# Patient Record
Sex: MALE | Race: BLACK OR AFRICAN AMERICAN | NOT HISPANIC OR LATINO | Employment: PART TIME | URBAN - METROPOLITAN AREA
[De-identification: names, ages, dates, MRNs, and addresses within clinical notes are randomized per-mention and may not be internally consistent; named-entity substitution may affect disease eponyms.]

---

## 2023-10-23 ENCOUNTER — HOSPITAL ENCOUNTER (EMERGENCY)
Facility: HOSPITAL | Age: 18
Discharge: HOME | End: 2023-10-23
Payer: COMMERCIAL

## 2023-10-23 VITALS
RESPIRATION RATE: 20 BRPM | WEIGHT: 215 LBS | DIASTOLIC BLOOD PRESSURE: 84 MMHG | HEART RATE: 87 BPM | OXYGEN SATURATION: 98 % | TEMPERATURE: 98.2 F | BODY MASS INDEX: 26.73 KG/M2 | SYSTOLIC BLOOD PRESSURE: 141 MMHG | HEIGHT: 75 IN

## 2023-10-23 PROCEDURE — 4500999001 HC ED NO CHARGE

## 2023-10-23 PROCEDURE — 99281 EMR DPT VST MAYX REQ PHY/QHP: CPT

## 2023-10-23 ASSESSMENT — PAIN DESCRIPTION - ORIENTATION: ORIENTATION: LEFT

## 2023-10-23 ASSESSMENT — PAIN - FUNCTIONAL ASSESSMENT: PAIN_FUNCTIONAL_ASSESSMENT: 0-10

## 2023-10-23 ASSESSMENT — PAIN DESCRIPTION - DESCRIPTORS: DESCRIPTORS: SHARP;SORE

## 2023-10-23 ASSESSMENT — PAIN DESCRIPTION - PAIN TYPE: TYPE: ACUTE PAIN

## 2023-10-23 ASSESSMENT — PAIN SCALES - GENERAL: PAINLEVEL_OUTOF10: 9

## 2023-10-23 ASSESSMENT — PAIN DESCRIPTION - LOCATION: LOCATION: HAND

## 2023-10-23 ASSESSMENT — PAIN DESCRIPTION - FREQUENCY: FREQUENCY: CONSTANT/CONTINUOUS

## 2024-03-01 ENCOUNTER — HOSPITAL ENCOUNTER (EMERGENCY)
Facility: HOSPITAL | Age: 19
Discharge: HOME | End: 2024-03-01
Payer: COMMERCIAL

## 2024-03-01 PROCEDURE — 4500999001 HC ED NO CHARGE

## 2024-05-28 ENCOUNTER — HOSPITAL ENCOUNTER (EMERGENCY)
Facility: HOSPITAL | Age: 19
Discharge: HOME | End: 2024-05-28
Payer: COMMERCIAL

## 2024-05-28 ENCOUNTER — APPOINTMENT (OUTPATIENT)
Dept: RADIOLOGY | Facility: HOSPITAL | Age: 19
End: 2024-05-28
Payer: COMMERCIAL

## 2024-05-28 VITALS
DIASTOLIC BLOOD PRESSURE: 71 MMHG | HEART RATE: 54 BPM | BODY MASS INDEX: 24.87 KG/M2 | OXYGEN SATURATION: 97 % | TEMPERATURE: 98.6 F | SYSTOLIC BLOOD PRESSURE: 142 MMHG | HEIGHT: 75 IN | RESPIRATION RATE: 18 BRPM | WEIGHT: 200 LBS

## 2024-05-28 DIAGNOSIS — M25.512 ACUTE PAIN OF LEFT SHOULDER: Primary | ICD-10-CM

## 2024-05-28 PROCEDURE — 2500000001 HC RX 250 WO HCPCS SELF ADMINISTERED DRUGS (ALT 637 FOR MEDICARE OP)

## 2024-05-28 PROCEDURE — 73030 X-RAY EXAM OF SHOULDER: CPT | Mod: LEFT SIDE | Performed by: RADIOLOGY

## 2024-05-28 PROCEDURE — 73030 X-RAY EXAM OF SHOULDER: CPT | Mod: LT

## 2024-05-28 PROCEDURE — 99283 EMERGENCY DEPT VISIT LOW MDM: CPT

## 2024-05-28 RX ORDER — ACETAMINOPHEN 325 MG/1
975 TABLET ORAL ONCE
Status: COMPLETED | OUTPATIENT
Start: 2024-05-28 | End: 2024-05-28

## 2024-05-28 RX ORDER — IBUPROFEN 400 MG/1
400 TABLET ORAL ONCE
Status: COMPLETED | OUTPATIENT
Start: 2024-05-28 | End: 2024-05-28

## 2024-05-28 RX ADMIN — IBUPROFEN 400 MG: 400 TABLET, FILM COATED ORAL at 14:48

## 2024-05-28 RX ADMIN — ACETAMINOPHEN 975 MG: 325 TABLET ORAL at 14:48

## 2024-05-28 ASSESSMENT — COLUMBIA-SUICIDE SEVERITY RATING SCALE - C-SSRS
1. IN THE PAST MONTH, HAVE YOU WISHED YOU WERE DEAD OR WISHED YOU COULD GO TO SLEEP AND NOT WAKE UP?: NO
6. HAVE YOU EVER DONE ANYTHING, STARTED TO DO ANYTHING, OR PREPARED TO DO ANYTHING TO END YOUR LIFE?: NO
2. HAVE YOU ACTUALLY HAD ANY THOUGHTS OF KILLING YOURSELF?: NO

## 2024-05-28 ASSESSMENT — PAIN SCALES - GENERAL
PAINLEVEL_OUTOF10: 6
PAINLEVEL_OUTOF10: 4

## 2024-05-28 ASSESSMENT — PAIN - FUNCTIONAL ASSESSMENT: PAIN_FUNCTIONAL_ASSESSMENT: 0-10

## 2024-05-28 ASSESSMENT — LIFESTYLE VARIABLES
HAVE YOU EVER FELT YOU SHOULD CUT DOWN ON YOUR DRINKING: NO
HAVE PEOPLE ANNOYED YOU BY CRITICIZING YOUR DRINKING: NO
TOTAL SCORE: 0
EVER HAD A DRINK FIRST THING IN THE MORNING TO STEADY YOUR NERVES TO GET RID OF A HANGOVER: NO
EVER FELT BAD OR GUILTY ABOUT YOUR DRINKING: NO

## 2024-05-28 ASSESSMENT — PAIN DESCRIPTION - PAIN TYPE: TYPE: ACUTE PAIN

## 2024-05-28 ASSESSMENT — PAIN DESCRIPTION - LOCATION: LOCATION: SHOULDER

## 2024-05-28 ASSESSMENT — PAIN DESCRIPTION - ORIENTATION: ORIENTATION: LEFT

## 2024-05-28 NOTE — ED PROVIDER NOTES
HPI   Chief Complaint   Patient presents with    Shoulder Pain     C.o left shoulder pain.        Patient is a 19-year-old male presents emergency department for evaluation of left shoulder pain.  Patient states that over the last few days he has had pain with external rotation and abduction overhead of his left shoulder.  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  He does not recall any specific injury or trauma.  He denies any previous injuries or issues with the left shoulder.  He states that this morning when he woke up the pain became more severe and he presents for further evaluation.  He describes it as a dull aching pain with occasional episodes of sharp pain if he tries to externally rotate his left shoulder.  He denies any pain in the elbow wrist or hand.  He denies any other complaints at this time.      History provided by:  Patient   used: No                        [place] Coma Scale Score: 15                     Patient History   Past Medical History:   Diagnosis Date    Other conditions influencing health status     No significant past surgical history     No past surgical history on file.  No family history on file.  Social History     Tobacco Use    Smoking status: Not on file    Smokeless tobacco: Not on file   Substance Use Topics    Alcohol use: Not on file    Drug use: Not on file       Physical Exam   ED Triage Vitals [05/28/24 1418]   Temperature Heart Rate Respirations BP   37 °C (98.6 °F) 54 18 142/71      Pulse Ox Temp Source Heart Rate Source Patient Position   97 % Temporal Monitor Sitting      BP Location FiO2 (%)     Right arm --       Physical Exam  Constitutional:       Appearance: Normal appearance.   Cardiovascular:      Rate and Rhythm: Normal rate and regular rhythm.   Pulmonary:      Effort: Pulmonary effort is normal.      Breath sounds: Normal breath sounds.   Musculoskeletal:         General: Tenderness present.      Comments:  Pain with range of motion with external rotation of left shoulder in abduction overhead.  No tenderness to palpation at rest.  Left upper extremity with good distal pulses and good range of motion at elbow wrist and digits.   Skin:     General: Skin is warm and dry.   Neurological:      Mental Status: He is alert.         ED Course & MDM   Diagnoses as of 05/28/24 2027   Acute pain of left shoulder       Medical Decision Making  Patient is a 19-year-old male presents emergency department for evaluation of left shoulder pain.    Lab work not warranted at today's visit.      Scans done today were interpreted/confirmed by radiologist and also interpreted by me which included x-ray left shoulder.  X-ray shows no acute bony abnormality.    Medications given at today's visit include PO Tylenol and ibuprofen    I saw this patient independently.  Patient remained stable in the emergency department.  X-ray showed no evidence for acute bony abnormality.  Given patient's physical exam, findings most consistent with rotator cuff injury.  Given pain and inflammation patient was educated continue Tylenol and ibuprofen and to follow-up closely with orthopedic provider.  He was educated to not do any heavy lifting or strenuous activity with left upper extremity.  He was placed in sling for comfort by nursing staff.  He was neurovascular intact following sling application.  Is agreeable to plan and discharge moving forward.  Emergent pathologies were considered for this patient, although I have low suspicion for anything acutely emergent given patient's clinical presentation, history, physical exam, stable vital signs, and relatively unremarkable workup.  Discharging patient home is reasonable plan of care for outpatient management.    All labs, imaging, and diagnostic studies were reviewed by me and patient was counseled on clinical impression, expectations, and plan.  Patient was educated to follow-up with PCP in the following 1-2  days.  All questions from patient were answered. They elicited understanding and were agreeable to course of treatment.  Patient was discharged in stable condition and given strict return precautions.    ** Disclaimer:  Parts of this document were written utilizing a voice to text dictation software.  Note may contain minor transcription or typographical errors that were inadvertently transcribed by the computer software.        Procedure  Procedures     Patt Bear PA-C  05/28/24 2029

## 2024-05-28 NOTE — Clinical Note
Renita Hernandez was seen and treated in our emergency department on 5/28/2024.  He may return to work on 05/29/2024.  Patient is to have no use of left arm with no heavy lifting and to continue wearing sling for comfort until cleared by orthopedic provider or primary care provider.     If you have any questions or concerns, please don't hesitate to call.      Patt Bear PA-C

## 2024-05-28 NOTE — DISCHARGE INSTRUCTIONS
Mireya follow-up closely with primary care provider in the following week.  New provider given should you need it.  Continue wearing sling for comfort, but continue gentle range of motion stretching.  Continue Tylenol and ibuprofen.  Follow-up closely with orthopedic provider listed.

## 2024-09-16 ENCOUNTER — APPOINTMENT (OUTPATIENT)
Dept: URGENT CARE | Age: 19
End: 2024-09-16
Payer: COMMERCIAL

## 2024-09-17 ENCOUNTER — OFFICE VISIT (OUTPATIENT)
Dept: URGENT CARE | Age: 19
End: 2024-09-17
Payer: COMMERCIAL

## 2024-09-17 VITALS
HEIGHT: 74 IN | OXYGEN SATURATION: 99 % | WEIGHT: 220 LBS | BODY MASS INDEX: 28.23 KG/M2 | SYSTOLIC BLOOD PRESSURE: 137 MMHG | HEART RATE: 60 BPM | DIASTOLIC BLOOD PRESSURE: 74 MMHG | RESPIRATION RATE: 16 BRPM | TEMPERATURE: 98.1 F

## 2024-09-17 DIAGNOSIS — R30.0 DYSURIA: Primary | ICD-10-CM

## 2024-09-17 LAB
POC BILIRUBIN, URINE: NEGATIVE
POC BLOOD, URINE: NEGATIVE
POC GLUCOSE, URINE: NEGATIVE MG/DL
POC KETONES, URINE: NEGATIVE MG/DL
POC LEUKOCYTES, URINE: NEGATIVE
POC NITRITE,URINE: NEGATIVE
POC PH, URINE: 6 PH
POC PROTEIN, URINE: NEGATIVE MG/DL
POC SPECIFIC GRAVITY, URINE: 1.02
POC UROBILINOGEN, URINE: 0.2 EU/DL

## 2024-09-17 PROCEDURE — 87491 CHLMYD TRACH DNA AMP PROBE: CPT

## 2024-09-17 PROCEDURE — 87591 N.GONORRHOEAE DNA AMP PROB: CPT

## 2024-09-17 PROCEDURE — 87661 TRICHOMONAS VAGINALIS AMPLIF: CPT

## 2024-09-17 RX ORDER — DOXYCYCLINE 100 MG/1
100 CAPSULE ORAL 2 TIMES DAILY
Qty: 14 CAPSULE | Refills: 0 | Status: SHIPPED | OUTPATIENT
Start: 2024-09-17 | End: 2024-09-24

## 2024-09-17 ASSESSMENT — ENCOUNTER SYMPTOMS
FREQUENCY: 0
HEMATURIA: 0
DYSURIA: 1
DIFFICULTY URINATING: 0
FLANK PAIN: 0

## 2024-09-17 NOTE — PROGRESS NOTES
"Subjective   Patient ID: Renita Hernandez is a 19 y.o. male. They present today with a chief complaint of Exposure to STD.    History of Present Illness  Pt presents for STI testing. Reports dysuria x 2 weeks. Girlfriend recently tested positive for chlamydia. He reports 1 female partner, no protection. Denies fever chills abd pain back pain nv testicular pain or swelling penile discharge/sores or itching hematuria urinary frequency or urgency.        Exposure to STD      Past Medical History  Allergies as of 09/17/2024    (No Known Allergies)       (Not in a hospital admission)       Past Medical History:   Diagnosis Date    Other conditions influencing health status     No significant past surgical history       No past surgical history on file.     reports that he has never smoked. He has never used smokeless tobacco.    Review of Systems  Review of Systems   Genitourinary:  Positive for dysuria. Negative for difficulty urinating, flank pain, frequency, genital sores, hematuria, penile discharge, penile pain, penile swelling, scrotal swelling, testicular pain and urgency.   All other systems reviewed and are negative.                                 Objective    Vitals:    09/17/24 1119   BP: 137/74   Pulse: 60   Resp: 16   Temp: 36.7 °C (98.1 °F)   SpO2: 99%   Weight: 99.8 kg (220 lb)   Height: 1.88 m (6' 2\")     No LMP for male patient.    Physical Exam  Vitals and nursing note reviewed.   Constitutional:       General: He is not in acute distress.     Appearance: Normal appearance. He is not ill-appearing, toxic-appearing or diaphoretic.   Cardiovascular:      Rate and Rhythm: Normal rate.      Pulses: Normal pulses.   Pulmonary:      Effort: Pulmonary effort is normal.      Breath sounds: Normal breath sounds. No wheezing, rhonchi or rales.   Abdominal:      General: Bowel sounds are normal.      Palpations: Abdomen is soft.      Tenderness: There is no abdominal tenderness. There is no right CVA tenderness, " left CVA tenderness, guarding or rebound.   Genitourinary:     Comments: Declined   Skin:     Findings: No rash.   Neurological:      Mental Status: He is alert.         Procedures    Point of Care Test & Imaging Results from this visit  No results found for this visit on 09/17/24.   No results found.    Diagnostic study results (if any) were reviewed by Savanna Whitlock PA-C.    Assessment/Plan   Allergies, medications, history, and pertinent labs/EKGs/Imaging reviewed by Savanna Whitlock PA-C.     Medical Decision Making  Advised testing for HIV/syphilis/Hepatitis via pcp or health department  Pt given IM rocephin today, rx for doxy sent to pharmacy  Advised to remain abstinent from intercourse until results communicated/treatment complete for both patient partner and for at least 2 weeks after completing treatment  Have all partners tested and treated  Will contact patient with positive results only  Follow up primary care in 2-3 weeks     Orders and Diagnoses  Diagnoses and all orders for this visit:  Dysuria  -     POCT UA Automated manually resulted  -     C. Trachomatis / N. Gonorrhoeae, Amplified Detection  -     Trichomonas vaginalis, Amplified  -     cefTRIAXone (Rocephin) 500 mg in lidocaine (Xylocaine) 2 mL injection  -     doxycycline (Vibramycin) 100 mg capsule; Take 1 capsule (100 mg) by mouth 2 times a day for 7 days. Take with at least 8 ounces (large glass) of water, do not lie down for 30 minutes after      Medical Admin Record      Follow Up Instructions  No follow-ups on file.    Patient disposition: Home    Electronically signed by Savanna Whitlock PA-C  11:34 AM

## 2024-09-17 NOTE — PATIENT INSTRUCTIONS
We will call with results, if positive, in 2-3 days. Do not have sex until appropriate treatment of yourself and your partner is completed. HIV and syphilis blood testing are recommended to complete STD testing. Contact your primary provider for this testing as soon as possible.

## 2024-09-18 LAB
C TRACH RRNA SPEC QL NAA+PROBE: POSITIVE
N GONORRHOEA DNA SPEC QL PROBE+SIG AMP: NEGATIVE
T VAGINALIS RRNA SPEC QL NAA+PROBE: NEGATIVE

## 2025-01-05 ENCOUNTER — OFFICE VISIT (OUTPATIENT)
Dept: URGENT CARE | Age: 20
End: 2025-01-05
Payer: COMMERCIAL

## 2025-01-05 VITALS
DIASTOLIC BLOOD PRESSURE: 77 MMHG | RESPIRATION RATE: 18 BRPM | TEMPERATURE: 98.1 F | SYSTOLIC BLOOD PRESSURE: 150 MMHG | HEART RATE: 63 BPM | BODY MASS INDEX: 29.53 KG/M2 | WEIGHT: 230 LBS | OXYGEN SATURATION: 99 %

## 2025-01-05 DIAGNOSIS — Z20.2 CHLAMYDIA CONTACT, TREATED: Primary | ICD-10-CM

## 2025-01-05 PROCEDURE — 87491 CHLMYD TRACH DNA AMP PROBE: CPT

## 2025-01-05 PROCEDURE — 87591 N.GONORRHOEAE DNA AMP PROB: CPT

## 2025-01-05 PROCEDURE — 99213 OFFICE O/P EST LOW 20 MIN: CPT | Performed by: SURGERY

## 2025-01-05 PROCEDURE — 1036F TOBACCO NON-USER: CPT | Performed by: SURGERY

## 2025-01-05 PROCEDURE — 87661 TRICHOMONAS VAGINALIS AMPLIF: CPT

## 2025-01-05 RX ORDER — AZITHROMYCIN 500 MG/1
1000 TABLET, FILM COATED ORAL ONCE
Qty: 2 TABLET | Refills: 0 | Status: SHIPPED | OUTPATIENT
Start: 2025-01-05 | End: 2025-01-05

## 2025-01-05 NOTE — PROGRESS NOTES
Chief Complaint   Patient presents with    Exposure to STD     BURNING WITH URINATION  2 MONTHS        Physical Exam:     Abdomen is soft, non-tender, and non-distended. No suprapubic tenderness. No CVA tenderness.     POC Labs:     No visits with results within 2 Day(s) from this visit.   Latest known visit with results is:   Office Visit on 09/17/2024   Component Date Value Ref Range Status    POC Glucose, Urine 09/17/2024 NEGATIVE  NEGATIVE mg/dl Final    POC Bilirubin, Urine 09/17/2024 NEGATIVE  NEGATIVE Final    POC Ketones, Urine 09/17/2024 NEGATIVE  NEGATIVE mg/dl Final    POC Specific Gravity, Urine 09/17/2024 1.020  1.005 - 1.035 Final    POC Blood, Urine 09/17/2024 NEGATIVE  NEGATIVE Final    POC PH, Urine 09/17/2024 6.0  No Reference Range Established PH Final    POC Protein, Urine 09/17/2024 NEGATIVE  NEGATIVE, 30 (1+) mg/dl Final    POC Urobilinogen, Urine 09/17/2024 0.2  0.2, 1.0 EU/DL Final    Poc Nitrite, Urine 09/17/2024 NEGATIVE  NEGATIVE Final    POC Leukocytes, Urine 09/17/2024 NEGATIVE  NEGATIVE Final    Neisseria gonorrhea,Amplified 09/17/2024 Negative  Negative Final    Chlamydia trachomatis, Amplified 09/17/2024 Positive (A)  Negative Final    Trichomonas Vaginalis 09/17/2024 Negative  Negative, Invalid, TRICH neg Final    Performance characteristics for Trichomonas Vaginalis testing on urine samples has been validated by [facility].  Testing on this sample type is not FDA-approved, but such approval is not necessary. This laboratory is certified by CLIA to perform high complexity testing.            Encounter Diagnosis   Name Primary?    Chlamydia contact, treated Yes            Medical Decision Making & Plan:       1 g azithromycin as symptoms not resolved from September testing  Wait one week before sexual contact         01/05/25 at 5:33 PM - Tanisha Grossman DO

## 2025-01-06 LAB
C TRACH RRNA SPEC QL NAA+PROBE: NEGATIVE
N GONORRHOEA DNA SPEC QL PROBE+SIG AMP: NEGATIVE
T VAGINALIS RRNA SPEC QL NAA+PROBE: NEGATIVE